# Patient Record
Sex: MALE | Race: WHITE | NOT HISPANIC OR LATINO | Employment: FULL TIME | URBAN - METROPOLITAN AREA
[De-identification: names, ages, dates, MRNs, and addresses within clinical notes are randomized per-mention and may not be internally consistent; named-entity substitution may affect disease eponyms.]

---

## 2017-12-07 ENCOUNTER — OFFICE VISIT (OUTPATIENT)
Dept: ORTHOPEDIC SURGERY | Facility: CLINIC | Age: 57
End: 2017-12-07

## 2017-12-07 VITALS — TEMPERATURE: 97.6 F | WEIGHT: 230 LBS | BODY MASS INDEX: 30.48 KG/M2 | HEIGHT: 73 IN

## 2017-12-07 DIAGNOSIS — Z96.641 STATUS POST TOTAL REPLACEMENT OF RIGHT HIP: Primary | ICD-10-CM

## 2017-12-07 PROCEDURE — 73502 X-RAY EXAM HIP UNI 2-3 VIEWS: CPT | Performed by: ORTHOPAEDIC SURGERY

## 2017-12-07 PROCEDURE — 99212 OFFICE O/P EST SF 10 MIN: CPT | Performed by: ORTHOPAEDIC SURGERY

## 2017-12-07 NOTE — PROGRESS NOTES
"Amilcar Bender : 1960 MRN: [id number] DATE: 2017    Chief Complaint:  Follow up right total hip      SUBJECTIVE:Patient returns today for  1 year follow up of right total hip replacement. Patient reports doing well with no unusual complaints. Denies any limitations due to the hip.    OBJECTIVE:    Temp 97.6 °F (36.4 °C)  Ht 185.4 cm (73\")  Wt 104 kg (230 lb)  BMI 30.34 kg/m2  Family History   Problem Relation Age of Onset   • Anxiety disorder Maternal Grandfather      Past Medical History:   Diagnosis Date   • Alcohol abuse    • Withdrawal symptoms, alcohol      Past Surgical History:   Procedure Laterality Date   • ANKLE SURGERY     • TOTAL HIP ARTHROPLASTY Right 10/18/2016    Procedure: TOTAL HIP ARTHROPLASTY;  Surgeon: Armand Weber MD;  Location: [facility];  Service:      Social History     Social History   • Marital status:      Spouse name: N/A   • Number of children: N/A   • Years of education: N/A     Occupational History   • Not on file.     Social History Main Topics   • Smoking status: Never Smoker   • Smokeless tobacco: Never Used   • Alcohol use Yes      Comment: 12-18 beers/ day per wife   • Drug use: No   • Sexual activity: Yes     Partners: Female     Other Topics Concern   • Not on file     Social History Narrative       Review of Systems: 14 point review of systems performed pertinent positives and negatives discussed above, all other systems are negative    Exam:. The incision is well healed. Range of motion is good without irritability. The calf is soft and nontender with a negative Homans sign. Alignment is neutral. Leg lengths are equal. Good hip flexion and abduction strength.Walks with nonantalgic gait. Intact to light touch with palpable distal pulses.     DIAGNOSTIC STUDIES  Xrays:Xrays 2 view right hip AP and lateral ordered: ordered and reviewed demonstrate a well positioned JOSE MIGUEL without complicating factors.In comparison to previous films there are no " changes    ASSESSMENT:    Follow up right hip replacement. doing well       PLAN:   Continue activities as tolerated  Follow up GABRIELA Weber MD  12/7/2017